# Patient Record
Sex: FEMALE | Race: WHITE | NOT HISPANIC OR LATINO | Employment: UNEMPLOYED | URBAN - METROPOLITAN AREA
[De-identification: names, ages, dates, MRNs, and addresses within clinical notes are randomized per-mention and may not be internally consistent; named-entity substitution may affect disease eponyms.]

---

## 2024-03-12 ENCOUNTER — TRANSCRIBE ORDERS (OUTPATIENT)
Dept: OBSTETRICS AND GYNECOLOGY | Facility: HOSPITAL | Age: 19
End: 2024-03-12
Payer: COMMERCIAL

## 2024-03-12 DIAGNOSIS — Z34.90 PREGNANCY, UNSPECIFIED GESTATIONAL AGE: ICD-10-CM

## 2024-03-12 DIAGNOSIS — O28.5 ABNORMAL GENETIC TEST DURING PREGNANCY: Primary | ICD-10-CM

## 2024-03-12 DIAGNOSIS — O28.5 MATERNAL SERUM SCREEN POSITIVE FOR TRISOMY 21: ICD-10-CM

## 2024-03-26 ENCOUNTER — OFFICE VISIT (OUTPATIENT)
Dept: OBSTETRICS AND GYNECOLOGY | Facility: HOSPITAL | Age: 19
End: 2024-03-26
Payer: COMMERCIAL

## 2024-03-26 ENCOUNTER — HOSPITAL ENCOUNTER (OUTPATIENT)
Dept: WOMENS IMAGING | Facility: HOSPITAL | Age: 19
Discharge: HOME | End: 2024-03-26
Admitting: REGISTERED NURSE
Payer: COMMERCIAL

## 2024-03-26 VITALS
DIASTOLIC BLOOD PRESSURE: 64 MMHG | WEIGHT: 182 LBS | SYSTOLIC BLOOD PRESSURE: 106 MMHG | HEIGHT: 67 IN | BODY MASS INDEX: 28.56 KG/M2

## 2024-03-26 DIAGNOSIS — Z34.90 PREGNANCY, UNSPECIFIED GESTATIONAL AGE: ICD-10-CM

## 2024-03-26 DIAGNOSIS — O28.5 ABNORMAL GENETIC TEST DURING PREGNANCY: ICD-10-CM

## 2024-03-26 DIAGNOSIS — O28.5 ABNORMAL GENETIC TEST IN PREGNANCY: Primary | ICD-10-CM

## 2024-03-26 DIAGNOSIS — O09.299 PRIOR PREGNANCY COMPLICATED BY IUGR, ANTEPARTUM: ICD-10-CM

## 2024-03-26 DIAGNOSIS — O28.5 MATERNAL SERUM SCREEN POSITIVE FOR TRISOMY 21: ICD-10-CM

## 2024-03-26 PROCEDURE — 76801 OB US < 14 WKS SINGLE FETUS: CPT | Performed by: OBSTETRICS & GYNECOLOGY

## 2024-03-26 PROCEDURE — 76813 OB US NUCHAL MEAS 1 GEST: CPT | Performed by: OBSTETRICS & GYNECOLOGY

## 2024-03-26 PROCEDURE — 76813 OB US NUCHAL MEAS 1 GEST: CPT

## 2024-03-26 PROCEDURE — 76801 OB US < 14 WKS SINGLE FETUS: CPT

## 2024-03-26 PROCEDURE — 99204 OFFICE O/P NEW MOD 45 MIN: CPT | Performed by: OBSTETRICS & GYNECOLOGY

## 2024-03-26 RX ORDER — PRENATAL VIT/IRON FUM/FOLIC AC 27MG-0.8MG
1 TABLET ORAL DAILY
COMMUNITY

## 2024-03-26 NOTE — ASSESSMENT & PLAN NOTE
Patient referred for abnormal cell free DNA test.  The test returned with an increased risk for trisomy 21.  Positive predictive value was calculated at 50.2%.  Ultrasound today demonstrated several abnormalities that were concerning for trisomy 21.  There was an increased nuchal translucency and the suggestion of a resolving cystic hygroma.  Additionally the fetal heart appears a normal and is strongly suggestive of an AV canal.  I informed the patient that the actual risk for this fetus having trisomy 21 is probably significantly greater than 50% in most likely greater than 80%.    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 Down syndrome also known as trisomy 21, is a genetic disorder caused by the presence of all or part of a third copy of chromosome 21. It is typically associated with physical growth delays, characteristic facial features, and mild to moderate intellectual disability. The average IQ of a young adult with Down syndrome is 50, equivalent to the mental age of an 8- or 9-year-old child, but this varies widely. Individuals with trisomy 21 syndrome also have an increased risk of a number of other health problems including congenital heart defect (40-50%), leukemia, thyroid disorders (20-50%). Of those with congenital heart disease, about 80% have an atrioventricular septal defect or ventricular septal defect. Mitral valve problems are also more common with advancing age. The sensitivity of cell-free fetal DNA for the detection of trisomy 21 is greater than 99%. The positive predictive value, however, is directly related to the prevalence of the condition and the population screened.   Given that cell-free DNA is considered a screening test, definitive diagnosis should be offered or confirmation with  cord blood testing performed. I noted options in prenatal genetic testing could include 1st trimester chorionic villous sampling (CVS). This would provide information  concerning the fetal chromosomal complement but not the presence of a fetal open neural tube defect. The procedure-related fetal loss with CVS was quoted as 1%. I then discussed options of prenatal genetic testing in the 2nd trimester including transabdominal amniocentesis for the determination of fetal chromosomal complement as well as amniotic fluid alpha-fetoprotein for the evaluation of a fetal open neural tube defect. A procedure-related fetal loss rate of 1 in 500 would be quoted with midtrimester amniocentesis. I also discussed the clinical utility of ultrasound for the detection of fetal aneuploidy as well as fetal open neural tube defects.   Additionally discussed was chorionic villous sampling the procedure was explained and the risks including risk of pregnancy loss were outlined.  Additional management including pregnancy termination were discussed with the patient.  Patient plans on continuing the pregnancy and desires to return at 15 weeks gestation for an amniocentesis.

## 2024-03-26 NOTE — LETTER
"2024     LADARIUS Carvalho  [address] [facility] [address]    Patient: Kyara Tompkins   YOB: 2005   Date of Visit: 3/26/2024     Dear LADARIUS Carvalho:       Thank you for referring Kyara Tompkins to me for evaluation. Below are the relevant portions of my assessment and plan of care.    If you have questions, please do not hesitate to call me. I look forward to following Kyara along with you.         Sincerely,        Douglas A. Milligan, MD        CC: No Recipients    Milligan, Douglas A, MD  [phone]  Sign when Signing Visit  Documentation of the ultrasound findings, images, and interpretations will be available in the patient's Viewpoint report which is located in the imaging tab in chart review.    Maternal/Fetal Medicine Consult Note     Name: Kyara Tompkins    : 2005     MRN: [id number]     Referring Provider: AUGUSTINE Carvalho*    Chief Complaint  No chief complaint on file.    Subjective     History of Present Illness:  Kyara Tompkins is a 18 y.o.  13w4d who presents today for abn NIPT    JULIANN: Estimated Date of Delivery: 24     ROS:   As noted in HPI.     Past Medical History:   Diagnosis Date   • Depression     on zoloft until       History reviewed. No pertinent surgical history.   OB History          2    Para   1    Term   0       1    AB   0    Living   1         SAB   0    IAB   0    Ectopic   0    Molar   0    Multiple   0    Live Births   1          Obstetric Comments   Fob#1 pregnancy #1&2    G1: induced at 36 wks for IUGR, bw 5#               Objective     Vital Signs  /64   Ht 170.2 cm (67\")   Wt 82.6 kg (182 lb)   LMP 2023 (Exact Date)   Estimated body mass index is 28.51 kg/m² as calculated from the following:    Height as of this encounter: 170.2 cm (67\").    Weight as of this encounter: 82.6 kg (182 lb).    Physical Exam    Ultrasound Impression:   See " Viewpoint    Assessment and Plan     Kyara Tompkins is a 18 y.o.  13w4d who presents today for abn NIPT    Diagnoses and all orders for this visit:    1. Abnormal genetic test in pregnancy (Primary)  Assessment & Plan:  Patient referred for abnormal cell free DNA test.  The test returned with an increased risk for trisomy 21.  Positive predictive value was calculated at 50.2%.  Ultrasound today demonstrated several abnormalities that were concerning for trisomy 21.  There was an increased nuchal translucency and the suggestion of a resolving cystic hygroma.  Additionally the fetal heart appears a normal and is strongly suggestive of an AV canal.  I informed the patient that the actual risk for this fetus having trisomy 21 is probably significantly greater than 50% in most likely greater than 80%.    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 Down syndrome also known as trisomy 21, is a genetic disorder caused by the presence of all or part of a third copy of chromosome 21. It is typically associated with physical growth delays, characteristic facial features, and mild to moderate intellectual disability. The average IQ of a young adult with Down syndrome is 50, equivalent to the mental age of an 8- or 9-year-old child, but this varies widely. Individuals with trisomy 21 syndrome also have an increased risk of a number of other health problems including congenital heart defect (40-50%), leukemia, thyroid disorders (20-50%). Of those with congenital heart disease, about 80% have an atrioventricular septal defect or ventricular septal defect. Mitral valve problems are also more common with advancing age. The sensitivity of cell-free fetal DNA for the detection of trisomy 21 is greater than 99%. The positive predictive value, however, is directly related to the prevalence of the condition and the population screened.   Given that cell-free DNA is considered a screening test,  definitive diagnosis should be offered or confirmation with  cord blood testing performed. I noted options in prenatal genetic testing could include 1st trimester chorionic villous sampling (CVS). This would provide information concerning the fetal chromosomal complement but not the presence of a fetal open neural tube defect. The procedure-related fetal loss with CVS was quoted as 1%. I then discussed options of prenatal genetic testing in the 2nd trimester including transabdominal amniocentesis for the determination of fetal chromosomal complement as well as amniotic fluid alpha-fetoprotein for the evaluation of a fetal open neural tube defect. A procedure-related fetal loss rate of 1 in 500 would be quoted with midtrimester amniocentesis. I also discussed the clinical utility of ultrasound for the detection of fetal aneuploidy as well as fetal open neural tube defects.   Additionally discussed was chorionic villous sampling the procedure was explained and the risks including risk of pregnancy loss were outlined.  Additional management including pregnancy termination were discussed with the patient.  Patient plans on continuing the pregnancy and desires to return at 15 weeks gestation for an amniocentesis.    Orders:  -     [facility]; Future    2. Pregnancy, unspecified gestational age  -     [facility]; Future    3. Prior pregnancy complicated by IUGR, antepartum  -     [facility]; Future         Follow Up  Return in about 1 week (around 2024).    I spent 45 minutes caring for the patient on the day of service. This included: obtaining or reviewing a separately obtained medical history, reviewing patient records, performing a medically appropriate exam and/or evaluation, counseling or educating the patient/family/caregiver, ordering medications, labs, and/or procedures and documenting such in the medical record. This does not  include time spent on review and interpretation of other tests such as fetal ultrasound or the performance of other procedures such as amniocentesis or CVS.      Douglas A. Milligan, MD  Maternal Fetal Medicine, [facility]     2024

## 2024-03-26 NOTE — PROGRESS NOTES
Pt will call today to make next appt with MADELEINE Patterson's office- last saw them on 3/1/24. NIPT +T21. Pt denies vaginal bleeding or abdominal pain. History of induction at 36 wks for IUGR. Pt reports first cousin with some sort of chromosomal disorder.

## 2024-03-26 NOTE — PROGRESS NOTES
"Documentation of the ultrasound findings, images, and interpretations will be available in the patient's Viewpoint report which is located in the imaging tab in chart review.    Maternal/Fetal Medicine Consult Note     Name: Kyara Tompkins    : 2005     MRN: [id number]     Referring Provider: AUGUSTINE Carvalho*    Chief Complaint  No chief complaint on file.    Subjective     History of Present Illness:  Kyara Tompkins is a 18 y.o.  13w4d who presents today for abn NIPT    JULIANN: Estimated Date of Delivery: 24     ROS:   As noted in HPI.     Past Medical History:   Diagnosis Date    Depression     on zoloft until       History reviewed. No pertinent surgical history.   OB History          2    Para   1    Term   0       1    AB   0    Living   1         SAB   0    IAB   0    Ectopic   0    Molar   0    Multiple   0    Live Births   1          Obstetric Comments   Fob#1 pregnancy #1&2    G1: induced at 36 wks for IUGR, bw 5#               Objective     Vital Signs  /64   Ht 170.2 cm (67\")   Wt 82.6 kg (182 lb)   LMP 2023 (Exact Date)   Estimated body mass index is 28.51 kg/m² as calculated from the following:    Height as of this encounter: 170.2 cm (67\").    Weight as of this encounter: 82.6 kg (182 lb).    Physical Exam    Ultrasound Impression:   See Viewpoint    Assessment and Plan     Kyara Tompkins is a 18 y.o.  13w4d who presents today for abn NIPT    Diagnoses and all orders for this visit:    1. Abnormal genetic test in pregnancy (Primary)  Assessment & Plan:  Patient referred for abnormal cell free DNA test.  The test returned with an increased risk for trisomy 21.  Positive predictive value was calculated at 50.2%.  Ultrasound today demonstrated several abnormalities that were concerning for trisomy 21.  There was an increased nuchal translucency and the suggestion of a resolving cystic hygroma.  Additionally the fetal heart appears a " normal and is strongly suggestive of an AV canal.  I informed the patient that the actual risk for this fetus having trisomy 21 is probably significantly greater than 50% in most likely greater than 80%.    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 Down syndrome also known as trisomy 21, is a genetic disorder caused by the presence of all or part of a third copy of chromosome 21. It is typically associated with physical growth delays, characteristic facial features, and mild to moderate intellectual disability. The average IQ of a young adult with Down syndrome is 50, equivalent to the mental age of an 8- or 9-year-old child, but this varies widely. Individuals with trisomy 21 syndrome also have an increased risk of a number of other health problems including congenital heart defect (40-50%), leukemia, thyroid disorders (20-50%). Of those with congenital heart disease, about 80% have an atrioventricular septal defect or ventricular septal defect. Mitral valve problems are also more common with advancing age. The sensitivity of cell-free fetal DNA for the detection of trisomy 21 is greater than 99%. The positive predictive value, however, is directly related to the prevalence of the condition and the population screened.   Given that cell-free DNA is considered a screening test, definitive diagnosis should be offered or confirmation with  cord blood testing performed. I noted options in prenatal genetic testing could include 1st trimester chorionic villous sampling (CVS). This would provide information concerning the fetal chromosomal complement but not the presence of a fetal open neural tube defect. The procedure-related fetal loss with CVS was quoted as 1%. I then discussed options of prenatal genetic testing in the 2nd trimester including transabdominal amniocentesis for the determination of fetal chromosomal complement as well as amniotic fluid alpha-fetoprotein for the  evaluation of a fetal open neural tube defect. A procedure-related fetal loss rate of 1 in 500 would be quoted with midtrimester amniocentesis. I also discussed the clinical utility of ultrasound for the detection of fetal aneuploidy as well as fetal open neural tube defects.   Additionally discussed was chorionic villous sampling the procedure was explained and the risks including risk of pregnancy loss were outlined.  Additional management including pregnancy termination were discussed with the patient.  Patient plans on continuing the pregnancy and desires to return at 15 weeks gestation for an amniocentesis.    Orders:  -     [facility]  Diagn[facility] [place]; Future    2. Pregnancy, unspecified gestational age  -     [facility] [place]; Future    3. Prior pregnancy complicated by IUGR, antepartum  -     [facility] [place]; Future         Follow Up  Return in about 1 week (around 2024).    I spent 45 minutes caring for the patient on the day of service. This included: obtaining or reviewing a separately obtained medical history, reviewing patient records, performing a medically appropriate exam and/or evaluation, counseling or educating the patient/family/caregiver, ordering medications, labs, and/or procedures and documenting such in the medical record. This does not include time spent on review and interpretation of other tests such as fetal ultrasound or the performance of other procedures such as amniocentesis or CVS.      Douglas A. Milligan, MD  Maternal Fetal Medicine, [facility] [place]     2024

## 2024-04-17 ENCOUNTER — DOCUMENTATION (OUTPATIENT)
Dept: OBSTETRICS AND GYNECOLOGY | Facility: HOSPITAL | Age: 19
End: 2024-04-17
Payer: COMMERCIAL

## 2024-04-17 ENCOUNTER — OFFICE VISIT (OUTPATIENT)
Dept: OBSTETRICS AND GYNECOLOGY | Facility: HOSPITAL | Age: 19
End: 2024-04-17
Payer: COMMERCIAL

## 2024-04-17 ENCOUNTER — HOSPITAL ENCOUNTER (OUTPATIENT)
Dept: WOMENS IMAGING | Facility: HOSPITAL | Age: 19
Discharge: HOME | End: 2024-04-17
Admitting: OBSTETRICS & GYNECOLOGY
Payer: COMMERCIAL

## 2024-04-17 VITALS — DIASTOLIC BLOOD PRESSURE: 64 MMHG | WEIGHT: 178.8 LBS | BODY MASS INDEX: 28 KG/M2 | SYSTOLIC BLOOD PRESSURE: 109 MMHG

## 2024-04-17 DIAGNOSIS — O28.5 ABNORMAL GENETIC TEST IN PREGNANCY: ICD-10-CM

## 2024-04-17 DIAGNOSIS — Z34.90 PREGNANCY, UNSPECIFIED GESTATIONAL AGE: ICD-10-CM

## 2024-04-17 DIAGNOSIS — O09.299 PRIOR PREGNANCY COMPLICATED BY IUGR, ANTEPARTUM: ICD-10-CM

## 2024-04-17 DIAGNOSIS — O28.5 ABNORMAL GENETIC TEST IN PREGNANCY: Primary | ICD-10-CM

## 2024-04-17 PROCEDURE — 76815 OB US LIMITED FETUS(S): CPT

## 2024-04-17 PROCEDURE — 59000 AMNIOCENTESIS DIAGNOSTIC: CPT

## 2024-04-17 PROCEDURE — 76946 ECHO GUIDE FOR AMNIOCENTESIS: CPT

## 2024-04-17 NOTE — PROGRESS NOTES
Documentation of the ultrasound findings, images, and interpretations will be available in the patient's Viewpoint report which is located in the imaging tab in chart review.    Amniocentesis successful.    Procedure note in Viewpoint.
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
## 2024-04-23 ENCOUNTER — TELEPHONE (OUTPATIENT)
Dept: OBSTETRICS AND GYNECOLOGY | Facility: HOSPITAL | Age: 19
End: 2024-04-23
Payer: COMMERCIAL

## 2024-04-30 ENCOUNTER — TELEPHONE (OUTPATIENT)
Dept: OBSTETRICS AND GYNECOLOGY | Facility: HOSPITAL | Age: 19
End: 2024-04-30
Payer: COMMERCIAL

## 2024-04-30 NOTE — TELEPHONE ENCOUNTER
Spoke with Ms. Tompkins.  Informed of final chromosome analysis - amniocentesis   47, XX, +T21 down syndrome.  Pt with vu, no questions at this time.

## 2024-05-09 ENCOUNTER — OFFICE VISIT (OUTPATIENT)
Dept: OBSTETRICS AND GYNECOLOGY | Facility: HOSPITAL | Age: 19
End: 2024-05-09
Payer: COMMERCIAL

## 2024-05-09 ENCOUNTER — HOSPITAL ENCOUNTER (OUTPATIENT)
Dept: WOMENS IMAGING | Facility: HOSPITAL | Age: 19
Discharge: HOME | End: 2024-05-09
Admitting: OBSTETRICS & GYNECOLOGY
Payer: COMMERCIAL

## 2024-05-09 VITALS — WEIGHT: 175 LBS | BODY MASS INDEX: 27.41 KG/M2 | SYSTOLIC BLOOD PRESSURE: 111 MMHG | DIASTOLIC BLOOD PRESSURE: 66 MMHG

## 2024-05-09 DIAGNOSIS — Z34.90 PREGNANCY, UNSPECIFIED GESTATIONAL AGE: ICD-10-CM

## 2024-05-09 DIAGNOSIS — O28.5 ABNORMAL GENETIC TEST IN PREGNANCY: Primary | ICD-10-CM

## 2024-05-09 DIAGNOSIS — O28.5 ABNORMAL GENETIC TEST IN PREGNANCY: ICD-10-CM

## 2024-05-09 PROCEDURE — 76811 OB US DETAILED SNGL FETUS: CPT

## 2024-06-06 ENCOUNTER — HOSPITAL ENCOUNTER (OUTPATIENT)
Dept: WOMENS IMAGING | Facility: HOSPITAL | Age: 19
Discharge: HOME | End: 2024-06-06
Admitting: REGISTERED NURSE
Payer: COMMERCIAL

## 2024-06-06 ENCOUNTER — OFFICE VISIT (OUTPATIENT)
Dept: OBSTETRICS AND GYNECOLOGY | Facility: HOSPITAL | Age: 19
End: 2024-06-06
Payer: COMMERCIAL

## 2024-06-06 VITALS — DIASTOLIC BLOOD PRESSURE: 72 MMHG | WEIGHT: 170 LBS | BODY MASS INDEX: 26.63 KG/M2 | SYSTOLIC BLOOD PRESSURE: 112 MMHG

## 2024-06-06 DIAGNOSIS — O35.13X0 TRISOMY 21 OF FETUS IN CURRENT SINGLETON PREGNANCY: ICD-10-CM

## 2024-06-06 DIAGNOSIS — O35.BXX0 CONGENITAL HEART DISEASE OF FETUS AFFECTING ANTEPARTUM CARE OF MOTHER, SINGLE OR UNSPECIFIED FETUS: Primary | ICD-10-CM

## 2024-06-06 PROCEDURE — 76816 OB US FOLLOW-UP PER FETUS: CPT

## 2024-06-06 RX ORDER — HYDROXYZINE PAMOATE 25 MG/1
CAPSULE ORAL
COMMUNITY
Start: 2024-04-23

## 2024-06-06 NOTE — LETTER
"2024       No Recipients    Patient: Kyara Tompkins   YOB: 2005   Date of Visit: 2024       Dear LADARIUS Carvalho,    Thank you for referring Kyara Tompkins to me for evaluation. Below is a copy of my consult note.    If you have questions, please do not hesitate to call me. I look forward to following Kyara along with you.         Sincerely,        Anna Tracey MD        CC:   No Recipients    Patient sees POB on 24. NIPT T21. Complains of recent dizzy spells.    Dr Tracey gave pt list of mental health service resources.        Maternal/Fetal Medicine Follow Up Note     Name: Kyara Tompkins    : 2005     MRN: [id number]     Referring Provider: AUGUSTINE Carvalho*    Chief Complaint  NIPT +T21, CH, absent NB, CHD-unbalanced AV canal and coarct    Subjective     History of Present Illness:  Kyara Tompkins is a 18 y.o.  24w0d who presents today for follow up in the setting of presumed fetal T21, complex congenital heart defect   S/p initial consult with Peds Cardiology    Patient with history of anxiety/depression - reports some increasing anxiety symptoms. Would like information regarding mental health services. Takes Zoloft 50mg qD and Vistaril PRN.     JULIANN: Estimated Date of Delivery: 24     ROS:   As noted in HPI.     Objective     Vital Signs  /72   Wt 77.1 kg (170 lb)   LMP 2023 (Exact Date)   Estimated body mass index is 26.63 kg/m² as calculated from the following:    Height as of 3/26/24: 170.2 cm (67\").    Weight as of this encounter: 77.1 kg (170 lb).    Ultrasound Impression:   See viewpoint      Assessment and Plan     Kyara Tompkins is a 18 y.o.  24w0d     Diagnoses and all orders for this visit:    1. Congenital heart disease of fetus affecting antepartum care of mother, single or unspecified fetus (Primary)    2. 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 Will need to deliver at  [facility]. Await follow up echo for delivery planning/transfer PRN.   Follow up with MFM in 4 weeks         Provided patient with list of mental health services with focus on maternity care. Reviewed red flags (SI/HI) of which there are none currently. May need increase in Zoloft. TBD.      Follow Up  4 weeks     I spent 30 minutes caring for the patient on the day of service. This included: obtaining or reviewing a separately obtained medical history, reviewing patient records, performing a medically appropriate exam and/or evaluation, counseling or educating the patient/family/caregiver, ordering medications, labs, and/or procedures and documenting such in the medical record. This does not include time spent on review and interpretation of other tests such as fetal ultrasound or the performance of other procedures such as amniocentesis or CVS.    Anna Tracey MD FACOG  Maternal Fetal Medicine, [facility]     2024

## 2024-06-07 PROBLEM — O09.299 PRIOR PREGNANCY COMPLICATED BY IUGR, ANTEPARTUM: Status: RESOLVED | Noted: 2024-03-26 | Resolved: 2024-06-07

## 2024-06-07 PROBLEM — O35.13X0 TRISOMY 21 OF FETUS IN CURRENT SINGLETON PREGNANCY: Status: ACTIVE | Noted: 2024-06-07

## 2024-06-07 PROBLEM — O35.BXX0 CONGENITAL HEART DISEASE OF FETUS AFFECTING ANTEPARTUM CARE OF MOTHER: Status: ACTIVE | Noted: 2024-06-07

## 2024-06-07 NOTE — PROGRESS NOTES
"    Maternal/Fetal Medicine Follow Up Note     Name: Kyara Tompkins    : 2005     MRN: [id number]     Referring Provider: AUGUSTINE Carvalho*    Chief Complaint  NIPT +T21, CH, absent NB, CHD-unbalanced AV canal and coarct    Subjective     History of Present Illness:  Kyara Tompkins is a 18 y.o.  24w0d who presents today for follow up in the setting of presumed fetal T21, complex congenital heart defect   S/p initial consult with Peds Cardiology    Patient with history of anxiety/depression - reports some increasing anxiety symptoms. Would like information regarding mental health services. Takes Zoloft 50mg qD and Vistaril PRN.     JULIANN: Estimated Date of Delivery: 24     ROS:   As noted in HPI.     Objective     Vital Signs  /72   Wt 77.1 kg (170 lb)   LMP 2023 (Exact Date)   Estimated body mass index is 26.63 kg/m² as calculated from the following:    Height as of 3/26/24: 170.2 cm (67\").    Weight as of this encounter: 77.1 kg (170 lb).    Ultrasound Impression:   See viewpoint      Assessment and Plan     Kyara Tompkins is a 18 y.o.  24w0d     Diagnoses and all orders for this visit:    1. Congenital heart disease of fetus affecting antepartum care of mother, single or unspecified fetus (Primary)    2. 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 Will need to deliver at  vs [facility]. Await follow up echo for delivery planning/transfer PRN.   Follow up with MFM in 4 weeks         Provided patient with list of mental health services with focus on maternity care. Reviewed red flags (SI/HI) of which there are none " currently. May need increase in Zoloft. TBD.      Follow Up  4 weeks     I spent 30 minutes caring for the patient on the day of service. This included: obtaining or reviewing a separately obtained medical history, reviewing patient records, performing a medically appropriate exam and/or evaluation, counseling or educating the patient/family/caregiver, ordering medications, labs, and/or procedures and documenting such in the medical record. This does not include time spent on review and interpretation of other tests such as fetal ultrasound or the performance of other procedures such as amniocentesis or CVS.    Anna Tracey MD FACOG  Maternal Fetal Medicine, [facility]     2024

## 2024-06-07 NOTE — ASSESSMENT & PLAN NOTE
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 Will need to deliver at  vs [facility]. Await follow up echo for delivery planning/transfer PRN.   Follow up with MFM in 4 weeks

## 2024-07-08 ENCOUNTER — OFFICE VISIT (OUTPATIENT)
Dept: OBSTETRICS AND GYNECOLOGY | Facility: HOSPITAL | Age: 19
End: 2024-07-08
Payer: COMMERCIAL

## 2024-07-08 ENCOUNTER — HOSPITAL ENCOUNTER (OUTPATIENT)
Dept: WOMENS IMAGING | Facility: HOSPITAL | Age: 19
Discharge: HOME | End: 2024-07-08
Admitting: REGISTERED NURSE
Payer: COMMERCIAL

## 2024-07-08 VITALS — SYSTOLIC BLOOD PRESSURE: 102 MMHG | WEIGHT: 175 LBS | DIASTOLIC BLOOD PRESSURE: 55 MMHG | BODY MASS INDEX: 27.41 KG/M2

## 2024-07-08 DIAGNOSIS — O28.5 ABNORMAL GENETIC TEST IN PREGNANCY: ICD-10-CM

## 2024-07-08 DIAGNOSIS — O35.13X0 TRISOMY 21 OF FETUS IN CURRENT SINGLETON PREGNANCY: Primary | ICD-10-CM

## 2024-07-08 DIAGNOSIS — Z3A.28 28 WEEKS GESTATION OF PREGNANCY: ICD-10-CM

## 2024-07-08 DIAGNOSIS — O35.BXX0 CONGENITAL HEART DISEASE OF FETUS AFFECTING ANTEPARTUM CARE OF MOTHER, SINGLE OR UNSPECIFIED FETUS: ICD-10-CM

## 2024-07-08 PROCEDURE — 76816 OB US FOLLOW-UP PER FETUS: CPT

## 2024-07-08 NOTE — PROGRESS NOTES
Patient denies bleeding, leaking fluid or contractions  NIPT + T21  Patients next follow up with KARLY Patterson APRN is 7/18/24

## 2024-07-15 NOTE — PROGRESS NOTES
Patient seen in Maternal Fetal Medicine clinic today. Please see full note in under imaging tab of patient chart in Epic (Viewpoint report).    Kaylah Ray MD